# Patient Record
(demographics unavailable — no encounter records)

---

## 2025-03-15 NOTE — ED.PDOC
Altered Mental Status


HPI Comments


HPI:


Poor Historian.


21-year-old male brought in by ambulance for evaluation of suspected overdose.  

Per EMS, they picked him up from in front of [address] with the family was.  

They do not know exactly what he overdosed on.  They gave the patient Narcan in 

the field 2.5 intranasally which helped him regained consciousness.  Patient was

initially unresponsive with stable vital signs.  No history of fall or trauma.


Per mom:  No reported fall or trauma.  Patient appeared to be altered and 

confused and almost unresponsive.  Mom administered Narcan and with the ambulanc

e arrived they administered another dose of Narcan intranasally.  Patient was 

smoking a Bong with his cousin that had wax














Past Medical History:  Drug induced psychosis


Past Surgical History:  Denies any


Patient vapes nicotine and smokes marijuana











REVIEW OF SYSTEMS:





CONSTITUTIONAL:





Denies acute: fever, diaphoresis, chills,   





HEAD:


Denies acute:  headache, photophobia





Eyes:


Denies acute: Double vision, vision loss, eye pain, eye discharge.





EARS: 


Denies acute: tinnitus, hearing loss, ear discharge, ear pain,





THROAT: 


Denies acute: sore throat, swelling, difficulty swallowing , pain with 

swallowing, change in  voice.





NECK:


Denies acute: neck pain, neck swelling, stiff neck.





HEART:


Denies acute : chest pain, palpitations,


 


LUNGS:


Denies acute: SOB, wheezing, cough, hemoptysis





ABDOMEN:


Denies acute: abdominal pain, Nausea, Vomiting, diarrhea, melena , hematemesis, 

hematochezia





SKIN:


Denies acute: rash, redness, lesions, itchiness. 





EXTREMITIES:


Denies acute: calf pain, numbness, tingling, weakness, denies pain in extremity.


Denies acute: Low back pain. 





Neuro:


Denies acute: focal neurological deficit, motor or sensory focal neurological 

deficit, tremors, seizure like activity,  loss of bowel or bladder function, 

cauda equina like symptoms.  





: 


Denies acute: dysuria, hematuria, flank pain, increase in urinary frequency.











PSYCH: 


Denies acute: hallucination, suicidal ideation, homicidal ideation.


 








PHYSICAL EXAM:


General: no acute distress, awake and alert.





Head: normocephalic, atraumatic.





Neck: 


supple, trachea is midline, no swelling. 








Throat:  Normal phonation.








Eyes:, no erythema, no purulent discharge, no proptosis, no icterus.


   


 


Heart:  regular rate, regular rhythm, no significant murmur appreciated.





Lungs: no apparent respiratory distress, 


    


No wheezing, no rhonchi, no crackles.  No stridors


   Clear to auscultation bilaterally.





Abdomen: non tender to palpation, non distended, soft, no guarding, no rebound, 

+ bowel sounds.











Neuro: Awake, Alert, oriented to name, self, situation, follows commands


 GCS=15.  


Speech is normal.





   


Skin: no petechia, no purpura, no cyanosis, non-pale, not jaundice. 





Lower extremities:  --no - Pitting edema


no deformity, no focal swelling, no calf TTP. 





Makes eye contact.





moves all four extremities.  


   





Face: no apparent facial droop.





 





Stroke: finger to nose cerebellar testing is intact.


   No pronator drift.


   Symmetrical  muscle strength b/l





PERRLA, EOM-I


CN 2-12 are grossly intact,  


Pedal pulses are palpable.


No nystagmus.


No nuchal rigidity, Kernig's sign, Brudzinski's sign, no meningeal signs.














_________________________________________


ED COURSE:


Chief Complaint:  Overdose


Time Seen by MD:  15:15


Primary Care Provider:  NONE


Reviewed Notes:  Nurses Notes, Medications, Allergies


Allergies:  


Coded Allergies:  


     NO KNOWN ALLERGIES (Unverified , 1/11/22)


Home Meds


Active Scripts


Ibuprofen Micronized (Ibuprofen) 800 Mg Tab, 800 MG PO Q8HPRN PRN, #20 TAB


   Prov:ALEXIA TENA PAC         9/3/24


Sulfamethoxazole W/Trimethopri (Bactrim Ds Tablet) 1 Tab Tb, 1 TAB PO BID for 7 

Days, #14 TAB


   Prov:ALEXIA TENA [facility]         9/3/24


Naproxen (NAPROSYN TABLET) 500 Mg Tb, 1 TAB PO BID for 5 Days, #10 TAB 1 Refill


   Prov:DEONNA QUAN MD         7/30/22


Cefdinir (Cefdinir) 300 Mg Cap, 1 CAP PO BID for 5 Days, #10 CAP


   Prov:DEONNA QUAN MD         7/30/22


Acetaminophen (Tylenol Extra Strength) 500 Mg Tab, 500 MG PO Q4HP PRN for 10 

Days, #50 TAB


   Prov:LUIS POPE NP         1/11/22


Fdlgfcvnguw-Dvnhxhcu-Yx (Bromphen/Pseudoephedrine 30-2-10 mg/5Ml) 1 Syp Syp, 5 

ML PO TID PRN for 10 Days, #240 ML


   Prov:LUIS POPE NP         1/11/22


Prednisone (Prednisone) 20 Mg Tab, 40 MG PO DAILY for 5 Days, #10 TAB


   Prov:LUIS POPE NP         1/11/22


Azithromycin (Zithromax Z-Jamil) 250 Mg Tab, 250 MG PO take as directed for 5 D

ays, #6 TAB


   Prov:LUIS POPE NP         1/11/22


Information Source:  Patient, Emergency Med Personnel


Mode of Arrival:  Ambulatory





Past Medical History


PAST MEDICAL HISTORY:  Denies


Surgical History:  Denies all surgeries





Family History


Family History:  Reviewed,noncontributory to illness, No family hx of Cancer, No

family hx of DM, No family hx of Heart souleymane, No family hx of HTN, No family hx 

ofKidney souleymane, No family hx of Liver souleymane, No family hx of Lung souleymane, No family hx 

of Stroke





Social History


Smoker:  Non-Smoker


Alcohol:  Denies ETOH Use


Drugs:  Denies Drug Use


Lives In:  Home





Was a procedure done?


Was a procedure done?:  No





Differential Diagnosis (ALOC)


Differential Diagnosis:  Dehydration, Hypoglycemia, DKA, Encephalopathy, 

Meningitis, Sepsis, Hypoxemia, Seizure, Closed Head Injury, CVA, Mass Lesion, 

SAH, Drug Overdose, ETOH Intoxication, Heart Failure, Renal Failure





X-Ray, Labs, Meds, VS





                                   Vital Signs








  Date Time  Temp Pulse Resp B/P (MAP) Pulse Ox O2 Delivery O2 Flow Rate FiO2


 


3/15/25 15:26 98.1 105 18 131/86 (101) 100   





 98.1       


 


3/15/25 15:19  105      








                                       Lab








Test


 3/15/25


17:11 3/15/25


15:45 3/15/25


15:40 Range/Units


 


 


Troponin I High Sensitivity 29  9   </=54  ng/L


 


White Blood Count


 


 9.0 


 


 4.4-10.8


10^3/uL


 


Red Blood Count


 


 4.98 


 


 4.5-5.90


10^6/uL


 


Hemoglobin  14.9   13.5-17.5  g/dL


 


Hematocrit  42.8   41.0-53.0  %


 


Mean Corpuscular Volume  85.9   80.0-100.0  fL


 


Mean Corpuscular Hemoglobin  29.9   28.0-32.0  pg


 


Mean Corpuscular Hemoglobin


Concent 


 34.8 


 


 32.0-36.0  g/dL





 


Red Cell Distribution Width  13.2   11.8-14.3  %


 


Platelet Count


 


 236 


 


 140-450


10^3/uL


 


Mean Platelet Volume  7.2   6.9-10.8  fL


 


Neutrophils (%) (Auto)  69.0   37.0-80.0  %


 


Lymphocytes (%) (Auto)  19.8   10.0-50.0  %


 


Monocytes (%) (Auto)  5.8   0.0-12.0  %


 


Eosinophils (%) (Auto)  4.9   0.0-7.0  %


 


Basophils (%) (Auto)  0.5   0.0-2.0  %


 


Neutrophils # (Auto)


 


 6.2 


 


 1.6-8.6  10


^3/uL


 


Lymphocytes # (Auto)


 


 1.8 


 


 0.4-5.4  10


^3/uL


 


Monocytes # (Auto)  0.5   0-1.3  10 ^3/uL


 


Eosinophils # (Auto)  0.4   0-0.8  10 ^3/uL


 


Basophils # (Auto)  0   0-0.2  10 ^3/uL


 


Nucleated Red Blood Cells  0.1    %


 


Sodium Level  139   136-145  mmol/L


 


Potassium Level  4.3   3.5-5.1  mmol/L


 


Chloride Level  105     mmol/L


 


Carbon Dioxide Level  29   20-31  mmol/L


 


Anion Gap  5   5-15  


 


Blood Urea Nitrogen  11   9-23  mg/dL


 


Creatinine


 


 1.12 


 


 0.700-1.30


mg/dL


 


Glomerular Filtration Rate


Calc 


 96 


 


 >90  mL/min





 


BUN/Creatinine Ratio  9.8 L  10.0-20.0  


 


Serum Glucose  138 H    mg/dL


 


Lactic Acid Level  1.9   0.4-2.0  mmol/L


 


Calcium Level  9.7   8.7-10.4  mg/dL


 


Magnesium Level  1.9   1.6-2.6  mg/dL


 


Total Bilirubin  0.7   0.2-1.0  mg/dL


 


Aspartate Amino Transferase


(AST) 


 30 


 


 13-40  U/L





 


Alanine Aminotransferase (ALT)  55 H  7-40  U/L


 


Alkaline Phosphatase  68     U/L


 


Total Protein  7.1   5.7-8.2  g/dL


 


Albumin  4.4   3.2-4.8  g/dL


 


Urine Color   Light-yellow  Yellow  


 


Urine Clarity   Clear  Clear  


 


Urine pH   6.0  5.0-9.0  


 


Urine Specific Gravity   1.021  1.001-1.035  


 


Urine Protein   1+ H Negative  


 


Urine Ketones   Negative  Negative  


 


Urine Blood   Negative  Negative  /uL


 


Urine Nitrite   Negative  Negative  


 


Urine Bilirubin   Negative  Negative  


 


Urine Urobilinogen   Normal  Negative  mg/dL


 


Urine Leukocyte Esterase   Negative  Negative  /uL


 


Urine RBC   <1  0 - 3  /hpf


 


Urine Microscopic WBC   1  0-3  /HPF


 


Urine Squamous Epithelial


Cells 


 


 None seen 


 <5  /hpf





 


Urine Bacteria   None seen  None Seen  /hpf


 


Urine Hyaline Casts   Few  0 - 2  /lpf


 


Urine Mucus   Few  None Seen  


 


Urine Glucose   Normal  Normal  mg/dL


 


Urine Opiates Screen   Neg  NEGATIVE  


 


Urine Fentanyl Screen   Pos  NEGATIVE  


 


Urine Barbiturates Screen   Neg  NEGATIVE  


 


Urine Phencyclidine Screen   Neg  NEGATIVE  


 


Urine Amphetamines Screen   Neg  NEGATIVE  


 


Urine Benzodiazepines Screen   Neg  NEGATIVE  


 


Urine Cocaine Screen   Neg  NEGATIVE  


 


Urine Cannabinoids Screen   Pos  NEGATIVE  








                               Current Medications








 Medications


  (Trade)  Dose


 Ordered  Sig/Manuel


 Route  Start Time


 Stop Time Status Last Admin


 


 Naloxone HCl


  (Narcan)  2 mg  ONCE  ONCE


 IV  3/15/25 15:30


 3/15/25 15:31 DC 3/15/25 16:15





 


 Sodium Chloride  1,000 ml @ 


 1,000 mls/hr  Q1H ONCE


 IV  3/15/25 15:30


 3/15/25 16:29 DC 3/15/25 16:01











Time of 1ST Reevaluation:  15:14


Reevaluation 1ST:  Improved


Patient Education/Counseling:  Diagnosis, Treatment


Family Education/Counseling:  Other


Comments


Patient presented with the above HPI.---overdose---workup was initiated. patient

was found with the above mentioned diagnosis. 


No reported head injury or trauma.


the following medications were ordered:  


 please refer to order lists of meds and tests obtained by myself Dr. Park. 





Patient ED course and VS have been stabilized. Patient has been reassessed in 

the ED and remained in a stable condition.  





Pertinent incidental findings were discussed with the patient and/or family. 





Patient/family voices understanding and is agreeable with plan. 





Patient has been observed in the ED adequate length of time to insure 

improvement/stability. 





Escalation of care considered:


Consideration of escalation to observation or admission


 


Patient left against medical advice








All the reports of any imaging studies that were ordered by myself were reviewed

by myself.





Departure 1


Departure


Time of Disposition:  18:25


Impression:  


   Primary Impression:  


   Drug overdose


   Additional Impressions:  


   Overdose of fentanyl


   Altered level of consciousness


   Left against medical advice


Disposition:  07 LEFT AGAINST MEDICAL ADVICE


Condition:  Guarded





Additional Instructions:  


Left against medical advice


Discharged With:  Self





Critical Care Note


Critical Care Time?:  Yes (35 min-critical care time only)











JAMES PARK DO                Mar 15, 2025 15:37

## 2025-03-16 NOTE — ECG
[facility]

                                       

Test Date:    2025-03-15               Test Time:    15:19:52

Pat Name:     MAREN CORTES           Department:   ER

Patient ID:   [id number]           Room:          

Gender:       M                        Technician:   LA

:          2003               Requested By: JAMES PARK

Order Number: [id number]        Reading MD:   Alex Jacobson

                                 Measurements

Intervals                              Axis          

Rate:         105                      P:            65

MO:           154                      QRS:          97

QRSD:         89                       T:            -14

QT:           313                                    

QTc:          414                                    

                           Interpretive Statements

Sinus tachycardia

Lateral infarct, acute

Electronically Signed On 3- 20:42:36 PDT by Alex Jacobson



Please click the below link to view image of tracing.

## 2025-03-24 NOTE — ECG
[facility]

                                       

Test Date:    2025               Test Time:    17:18:56

Pat Name:     MAREN CORTES           Department:   ED

Patient ID:   [id number]           Room:          

Gender:       M                        Technician:   CRISS

:          2003               Requested By: AMALIA AVLERIO

Order Number: [id number]        Reading MD:   Alex Jacobson

                                 Measurements

Intervals                              Axis          

Rate:         108                      P:            54

DE:           133                      QRS:          84

QRSD:         91                       T:            -6

QT:           324                                    

QTc:          435                                    

                           Interpretive Statements

Sinus tachycardia

Borderline T abnormalities, inferior leads

Baseline wander in lead(s) II,V3

Electronically Signed On 3- 13:25:43 PDT by Alex Jacobson



Please click the below link to view image of tracing.

## 2025-03-24 NOTE — [facility]
CHEST RADIOGRAPH



Indication: aloc



Technique: Single frontal view of the chest was obtained



COMPARISON: None



FINDINGS: 



Lines and Tubes: None



Lungs: Clear



Pleura: No effusion.



No pneumothorax. 



Cardiomediastinal contours: Unremarkable



Bones: Unremarkable



IMPRESSION: 



1. No acute disease. 



 



Electronically Signed by: Fady Ibarra at 03/24/2025 17:08:25 PM

## 2025-03-24 NOTE — ED.PDOC
SOB-HPI


HPI Comments


21 y.o male presents to the ED for a chief complaint of generalized weakness 

associated with palpitations x 2 days. Patient presents to triage with SPO2 in 

the low 80's on room air, shallowing breathing and cyanotic lips. Patient was 

placed on 10 liters of oxygen via mask with SPO2 of 97%. Patient reports daily 

frequent use of Fentanyl and last used 2 days ago. Patient reports he was 

playing basketball for 2 hours today, friends noticed patient pale and malaise 

looking. Mom brought patient in. 


Patient was recently seen at this ED around 3/15/25 for possible OD but AMA once

regaining full consciousness s/p multiple Narcan administrated by EMS, family 

and nursing staff at the ED. Patient at this time denies any chest pain, fever, 

chills, nausea, vomiting.


Chief Complaint:  Palpitations


Time Seen by MD:  16:26


Primary Care Provider:  NONE


Reviewed notes:  Nurses Notes, Medications, Allergies


Information Source:  Patient


Mode of Arrival:  Ambulatory


Severity:  Moderate


Timing:  Hours


Duration:  Since onset


Context:  At Rest


PE Risk Factors:  None


History of:  None


Modifying Factors:  Nothing


Associated Signs and Symptoms:  Other





Past Medical History


PAST MEDICAL HISTORY:  Denies


Surgical History:  Denies all surgeries





Family History


Family History:  Reviewed,noncontributory to illness, No family hx of Cancer, No

family hx of DM, No family hx of Heart souleymane, No family hx of HTN, No family hx 

ofKidney souleymane, No family hx of Liver souleymane, No family hx of Lung souleymane, No family hx 

of Stroke





Social History


Smoker:  Cigarettes, Other (vape)


Alcohol:  Occasionally


Drugs:  Marijuana, Methamphetamine


Lives In:  Home





Constitutional:  reports: malaise; denies: chills, diaphoresis, fatigue, fever, 

sweats, weakness, others


EENTM:  denies: blurred vision, double vision, ear bleeding, ear discharge, ear 

drainage, ear pain, ear ringing, eye pain, eye redness, hearing loss, mouth 

pain, mouth swelling, nasal discharge, nose bleeding, nose congestion, nose 

pain, photophobia, tearing, throat pain, throat swelling, voice changes, others


Respiratory:  denies: cough, hemoptysis, orthopnea, SOB at rest, shortness of 

breath, SOB with excertion, stridor, wheezing, others


Cardiovascular:  reports: palpitations; denies: chest pain, dizzy spells, 

diaphoresis, Dyspnea on exertion, edema, irregular heart beat, left arm pain, 

lightheadedness, PND, syncope, others


Gastrointestinal:  denies: abdomen distended, abdominal pain, blood streaked 

bowels, constipated, diarrhea, dysphagia, difficulty swallowing, hematemesis, 

melena, nausea, poor appetite, poor fluid intake, rectal bleeding, rectal pain, 

vomiting, others


Genitourinary:  denies: burning, dysuria, flank pain, frequency, hematuria, 

incontinence, penile discharge, penile sore, pain, testicle pain, testicle 

swelling, urgency, others


Neurological:  denies: dizziness, fainting, headache, left sided numbness, left 

sided weakness, numbness, paresthesia, pre-existing deficit, right sided 

numbness, right sided weakness, seizure, speech problems, tingling, tremors, 

weakness, others


Musculoskeletal:  denies: back pain, gout, joint pain, joint swelling, muscle 

pain, muscle stiffness, neck pain, others


Integumetry:  denies: bruises, change in color, change in hair/nails, dryness, 

laceration, lesions, lumps, rash, wounds, others


Allergic/Immunocompromised:  denies: Difficulty Healing, Frequent Infections, 

Hives, Itching, others


Hematologic/Lymphatic:  denies: anemia, blood clots, easy bleeding, easy 

bruising, swollen glands, others


Endocrine:  denies: excessive hunger, excessive sweating, excessive thirst, 

excessive urination, flushing, intolerance to cold, intolerance to heat, 

unexplained weight gain, unexplained weight loss, others


Psychiatric:  denies: anxiety, bipolar disorder, depression, hopeless, panic 

disorder, schizophrenia, sleepless, suicidal, others


All Other Systems:  Reviewed and Negative





Physical Exam


General Appearance:  Other (The patient was somewhat sleepy upon arrival)


HEENT:  Normal ENT Inspection, Pharynx Normal, TMs Normal


Neck:  Full Range of Motion, Non-Tender, Normal, Normal Inspection


Respiratory:  Chest Non-Tender, Lungs Clear, No Accessory Muscle Use, No 

Respiratory Distress, Normal Breath Sounds


Cardiovascular:  No Edema, No JVD, No Murmur, No Gallop, Normal Peripheral 

Pulses, Regular Rate/Rhythm


Breast Exam:  Deferred


Gastrointestinal:  No Organomegaly, Non Tender, No Pulsatile Mass, Normal Bowel 

Sounds, Soft


Genitalia:  Deferred


Pelvic:  Deferred


Rectal:  Deferred


Extremities:  No calf tenderness, Normal capillary refill, Normal inspection, 

Normal range of motion, Non-tender, No pedal edema


Musculoskeletal :  


   Apperance:  Normal


Neurologic:  Alert, CNs II-XII nml as Tested, No Motor Deficits, Normal Affect, 

Normal Mood, No Sensory Deficits


Cerebellar Function:  Normal


Reflexes:  Normal


Skin:  Dry, Normal Color, Warm


Lymphatic:  No Adenopathy





EKG


EKG :  


   Pulse Rate (adult):  104


   Cardiac Rhythm:  ST





Was a procedure done?


Was a procedure done?:  No





Differential Dx


Differential Diagnosis:  Hyperventilation, Respiratory Distress, URI


Comments


Drug toxicity, Drug overdose, tachycardia, hypoxemia





X-Ray, Labs, Meds, VS





                                   Vital Signs








  Date Time  Temp Pulse Resp B/P (MAP) Pulse Ox O2 Delivery O2 Flow Rate FiO2


 


3/24/25 18:38  102      


 


3/24/25 18:00  94 20 118/63 (81) 97   


 


3/24/25 17:18  108      


 


3/24/25 16:46 97.6 126 12 106/54 (71) 97   





 97.6       


 


3/24/25 16:46  127 12  97 Simple Mask* 8 60


 


3/24/25 16:39  104      


 


3/24/25 16:31 97.9 115 12 110/65 (80) 83   





 97.9       


 


3/24/25 16:25  104      








                                       Lab








Test


 3/24/25


16:32 Range/Units


 


 


White Blood Count


 8.7 


 4.4-10.8


10^3/uL


 


Red Blood Count


 5.07 


 4.5-5.90


10^6/uL


 


Hemoglobin 15.0  13.5-17.5  g/dL


 


Hematocrit 43.4  41.0-53.0  %


 


Mean Corpuscular Volume 85.6  80.0-100.0  fL


 


Mean Corpuscular Hemoglobin 29.6  28.0-32.0  pg


 


Mean Corpuscular Hemoglobin


Concent 34.6 


 32.0-36.0  g/dL





 


Red Cell Distribution Width 12.9  11.8-14.3  %


 


Platelet Count


 288 


 140-450


10^3/uL


 


Mean Platelet Volume 7.4  6.9-10.8  fL


 


Neutrophils (%) (Auto) 58.5  37.0-80.0  %


 


Lymphocytes (%) (Auto) 28.8  10.0-50.0  %


 


Monocytes (%) (Auto) 4.7  0.0-12.0  %


 


Eosinophils (%) (Auto) 7.1 H 0.0-7.0  %


 


Basophils (%) (Auto) 0.9  0.0-2.0  %


 


Neutrophils # (Auto)


 5.1 


 1.6-8.6  10


^3/uL


 


Lymphocytes # (Auto)


 2.5 


 0.4-5.4  10


^3/uL


 


Monocytes # (Auto) 0.4  0-1.3  10 ^3/uL


 


Eosinophils # (Auto) 0.6  0-0.8  10 ^3/uL


 


Basophils # (Auto) 0.1  0-0.2  10 ^3/uL


 


Nucleated Red Blood Cells 0.1   %


 


Urine Color Yellow  Yellow  


 


Urine Clarity Turbid H Clear  


 


Urine pH 6.0  5.0-9.0  


 


Urine Specific Gravity 1.025  1.001-1.035  


 


Urine Protein 1+ H Negative  


 


Urine Ketones Trace  Negative  


 


Urine Blood Negative  Negative  /uL


 


Urine Nitrite Negative  Negative  


 


Urine Bilirubin Negative  Negative  


 


Urine Urobilinogen Normal  Negative  mg/dL


 


Urine Leukocyte Esterase Negative  Negative  /uL


 


Urine RBC 1  0 - 3  /hpf


 


Urine Microscopic WBC 3  0-3  /HPF


 


Urine Squamous Epithelial


Cells Few 


 <5  /hpf





 


Urine Bacteria None seen  None Seen  /hpf


 


Urine Hyaline Casts Few  0 - 2  /lpf


 


Urine Granular Casts Few  0  /lpf


 


Urine Mucus Few  None Seen  


 


Urine Glucose Trace  Normal  mg/dL


 


Sodium Level 138  136-145  mmol/L


 


Potassium Level 4.4  3.5-5.1  mmol/L


 


Chloride Level 99    mmol/L


 


Carbon Dioxide Level 30  20-31  mmol/L


 


Anion Gap 9  5-15  


 


Blood Urea Nitrogen 12  9-23  mg/dL


 


Creatinine


 1.71 H


 0.700-1.30


mg/dL


 


Glomerular Filtration Rate


Calc 58 


 >90  mL/min





 


BUN/Creatinine Ratio 7.0 L 10.0-20.0  


 


Serum Glucose 182 H   mg/dL


 


Calcium Level 10.0  8.7-10.4  mg/dL


 


Total Bilirubin 1.0  0.2-1.0  mg/dL


 


Aspartate Amino Transferase


(AST) 49 H


 13-40  U/L





 


Alanine Aminotransferase (ALT) 78 H 7-40  U/L


 


Alkaline Phosphatase 72    U/L


 


Troponin I High Sensitivity 14  </=54  ng/L


 


Total Protein 7.8  5.7-8.2  g/dL


 


Albumin 4.8  3.2-4.8  g/dL


 


Urine Opiates Screen Neg  NEGATIVE  


 


Urine Fentanyl Screen Pos  NEGATIVE  


 


Urine Barbiturates Screen Neg  NEGATIVE  


 


Urine Phencyclidine Screen Neg  NEGATIVE  


 


Urine Amphetamines Screen Neg  NEGATIVE  


 


Urine Benzodiazepines Screen Neg  NEGATIVE  


 


Urine Cocaine Screen Neg  NEGATIVE  


 


Urine Cannabinoids Screen Pos  NEGATIVE  


 


Plasma/Serum Blood Alcohol < 3.0  <10  mg/dL





CHEST RADIOGRAPH


IMPRESSION: 





1. No acute disease. 





 The patient's urine tox is positive for fentanyl and marijuana 


The patient was urine test is negative for infection 


The chemistry panel is within normal limits except for mild hyperglycemia 


The patient CBC is within normal limits 


At this time, the patient was being discharged and will follow up with the 

primary care doctor


The patient was given substance abuse counseling 


The patient was now awake and alert and able to answer questions.


Images Reviewed?:  Images reviewed and evaluated by me


Time of 1ST Reevaluation:  16:33


Reevaluation 1ST:  Unchanged


Patient Education/Counseling:  Diagnosis, Treatment, Prognosis, Need For Follow 

Up


Family Education/Counseling:  No Family Present





Departure 1


Departure


Time of Disposition:  19:59


Impression:  


   Primary Impression:  


   Polysubstance abuse


Disposition:  01 HOME / SELF CARE / HOMELESS


Condition:  Fair


Discharged With:  Self





Critical Care Note


Critical Care Time?:  No





Stability


Stability form required:  No








I personally scribed for AMALIA VALERIO MD ([email or web address]) on 3/24/25 at 16:39.  

Electronically submitted by [facility] Barksdale [facility]


I personally scribed for AMALIA VALERIO MD ([email or web address]) on 3/24/25 at 19:31.  

Electronically submitted by Nathaly Barksdale ([facility]





AMALIA VALERIO MD              Mar 24, 2025 16:39

## 2025-03-26 NOTE — ED.PDOC
History of Present Illness


HPI Comments


22 y/o obese M, with a history of polysubstance abuse, is BIBA for c/o ALOC 

w/decrease responsiveness s/p substance overdose, today. Per EMS report, 

patient's family called after finding patient in altered state, earlier, this 

evening. Patient was noted to have been found on scene with family performing 

CPR amidst pulse present and patient  haivng agonal breathing and a SpO2 of 

75%RA. EMS then endorses on giving the patient 2mg Narcan IV, with positive 

response, and placing him on 15LPM NRB en route. Upon arrival to ED, osvaldo 

admitst to Fentanyl use, earlier, with no other reported associated symptoms at 

this time.


Chief Complaint:  Overdose


Time Seen by MD:  23:10


Primary Care Provider:  unknown


Reviewed Notes:  Nurses Notes, Paramedic Notes, Medications, Allergies


Allergies:  


Coded Allergies:  


     NO KNOWN ALLERGIES (Unverified , 22)


Home Meds


Active Scripts


Ibuprofen Micronized (Ibuprofen) 800 Mg Tab, 800 MG PO Q8HPRN PRN, #20 TAB


   Prov:ALEXIA TENA PAC         9/3/24


Sulfamethoxazole W/Trimethopri (Bactrim Ds Tablet) 1 Tab Tb, 1 TAB PO BID for 7 

Days, #14 TAB


   Prov:ALEXIA TENA PAC         9/3/24


Naproxen (NAPROSYN TABLET) 500 Mg Tb, 1 TAB PO BID for 5 Days, #10 TAB 1 Refill


   Prov:DEONNA QUAN MD         22


Cefdinir (Cefdinir) 300 Mg Cap, 1 CAP PO BID for 5 Days, #10 CAP


   Prov:DEONNA QUAN MD         22


Acetaminophen (Tylenol Extra Strength) 500 Mg Tab, 500 MG PO Q4HP PRN for 10 

Days, #50 TAB


   Prov:LUIS POPE NP         22


Dtysicvdugj-Filljtxp-Cv (Bromphen/Pseudoephedrine 30-2-10 mg/5Ml) 1 Syp Syp, 5 

ML PO TID PRN for 10 Days, #240 ML


   Prov:LUIS POPE NP         22


Prednisone (Prednisone) 20 Mg Tab, 40 MG PO DAILY for 5 Days, #10 TAB


   Prov:LUIS POPE NP         22


Azithromycin (Zithromax Z-Jamil) 250 Mg Tab, 250 MG PO take as directed for 5 

Days, #6 TAB


   Prov:LUIS POPE NP         22


Information Source:  Patient, Emergency Med Personnel


Mode of Arrival:  EMS


Severity:  Moderate


Timing:  Hours


Duration:  Since onset


Prehospital treatment:  12 Lead EKG, Accucheck, Cardiac Monitor, Other (Narcan )





Past Medical History


Past Medical History (Other):  


 obesity


Surgical History:  Denies all surgeries





Family History


Family History:  Reviewed,noncontributory to illness, No family hx of Cancer, No

family hx of DM, No family hx of Heart souleymane, No family hx of HTN, No family hx 

ofKidney souleymane, No family hx of Liver souleymane, No family hx of Lung souleymane, No family hx 

of Stroke





Social History


Smoker:  Cigarettes, Other (vape )


Alcohol:  Occasionally


Drugs:  Marijuana, Methamphetamine, Other (Fentanly)


Lives In:  Home





All Other Systems:  Reviewed and Negative (Comprehensive systems review obtained

and negative except for what is stated in the HPI.)





Physical Exam


General Appearance:  No Apparent Distress, Obese


HEENT:  Normal ENT Inspection, Pharynx Normal, TMs Normal


Neck:  Full Range of Motion, Non-Tender, Normal, Normal Inspection


Respiratory:  Other (hypoxic )


Cardiovascular:  No Edema, No JVD, No Murmur, No Gallop, Normal Peripheral 

Pulses, Regular Rate/Rhythm


Breast Exam:  Deferred


Gastrointestinal:  No Organomegaly, Non Tender, No Pulsatile Mass, Normal Bowel 

Sounds, Soft


Genitalia:  Deferred


Pelvic:  Deferred


Rectal:  Deferred


Extremities:  No calf tenderness, Normal capillary refill, Normal inspection, 

Normal range of motion, Non-tender, No pedal edema


Musculoskeletal :  


   Extremity Location:  Chest (chest wall )


   Apperance:  Normal, Tenderness


Neurologic:  Alert, CNs II-XII nml as Tested, No Motor Deficits, Normal Affect, 

Normal Mood, No Sensory Deficits


Cerebellar Function:  Normal


Reflexes:  Normal


Skin:  Dry, Pallor, Warm, Other (erythema to chest wall )


Lymphatic:  No Adenopathy





Was a procedure done?


Was a procedure done?:  No





Differential Dx


Considerations may include:


opioid overdose, substance abuse, toxic metabolic encephalopathy





X-Ray, Labs, Meds, VS





                                   Vital Signs








  Date Time  Temp Pulse Resp B/P (MAP) Pulse Ox O2 Delivery O2 Flow Rate FiO2


 


3/26/25 02:00  73 16 106/51 (69) 94   


 


3/25/25 23:32 97.9 108 12 137/82 (100) 97   





 97.9       


 


3/25/25 23:07 97.7 112 17 120/73 (89) 88   





 97.7       


 


3/25/25 23:07  112 17  88 Non-Rebreather 15 N/A


 


3/25/25 22:50 99.1 119 32 113/76 (88) 90   





 99.1       








                                       Lab








Test


 3/26/25


05:11 Range/Units


 


 


White Blood Count


 15.0 #H


 4.4-10.8


10^3/uL


 


Red Blood Count


 5.48 


 4.5-5.90


10^6/uL


 


Hemoglobin 16.2  13.5-17.5  g/dL


 


Hematocrit 46.5  41.0-53.0  %


 


Mean Corpuscular Volume 84.9  80.0-100.0  fL


 


Mean Corpuscular Hemoglobin 29.5  28.0-32.0  pg


 


Mean Corpuscular Hemoglobin


Concent 34.8 


 32.0-36.0  g/dL





 


Red Cell Distribution Width 12.6  11.8-14.3  %


 


Platelet Count


 299 


 140-450


10^3/uL


 


Mean Platelet Volume 7.4  6.9-10.8  fL


 


Neutrophils (%) (Auto) 94.3 H 37.0-80.0  %


 


Lymphocytes (%) (Auto) 3.0 L 10.0-50.0  %


 


Monocytes (%) (Auto) 2.3  0.0-12.0  %


 


Eosinophils (%) (Auto) 0.2  0.0-7.0  %


 


Basophils (%) (Auto) 0.2  0.0-2.0  %


 


Neutrophils # (Auto)


 14.2 H


 1.6-8.6  10


^3/uL


 


Lymphocytes # (Auto)


 0.4 


 0.4-5.4  10


^3/uL


 


Monocytes # (Auto) 0.3  0-1.3  10 ^3/uL


 


Eosinophils # (Auto) 0  0-0.8  10 ^3/uL


 


Basophils # (Auto) 0  0-0.2  10 ^3/uL


 


Nucleated Red Blood Cells 0.1   %


 


Sodium Level Pending   


 


Potassium Level Pending   


 


Chloride Level Pending   


 


Carbon Dioxide Level Pending   


 


Anion Gap Pending   


 


Blood Urea Nitrogen Pending   


 


Creatinine Pending   


 


Glomerular Filtration Rate


Calc Pending 


  





 


BUN/Creatinine Ratio Pending   


 


Serum Glucose Pending   


 


Lactic Acid Level Pending   


 


Calcium Level Pending   








                               Current Medications








 Medications


  (Trade)  Dose


 Ordered  Sig/Manuel


 Route  Start Time


 Stop Time Status Last Admin


 


 Sodium Chloride  1,000 ml @ 


 1,000 mls/hr  Q1H ONCE


 IV  3/26/25 00:00


 3/26/25 03:41 DC 3/25/25 23:16





 


 Naloxone HCl


  (Narcan)  2 mg  ONCE  ONCE


 IV  3/26/25 03:45


 3/26/25 03:46 DC 3/25/25 23:10





 


 Ondansetron HCl


  (Zofran)  4 mg  ONCE  ONCE


 IV  3/26/25 03:45


 3/26/25 03:46 DC 3/25/25 23:14





 


 Methylprednisolone


 Sodium Succinate


  (Solu Medrol)  125 mg  ONCE  ONCE


 IV  3/26/25 03:45


 3/26/25 03:46 DC 3/25/25 23:16

















[place] [id number]


Ph: [phone]





DIAGNOSTIC IMAGING


Diagnostic Imaging Report : [id number]


Signed








PATIENT: MAREN CORTES   ACCT: [id number]   UNIT: [id number]


: 2003   LOC: ER   ROOM / BED:  / 


AGE / SEX: 21 / M   ADM STATUS: REG ER   SERVICE DT: 25





ORDERING PHYSICIAN: ALEXIA TENA PAC


PROCEDURE(s): CXRP - CHEST PORTABLE


REASON: SOB


ORDER NUMBER(s): [id number], ACCESSION NUMBER(s): [id number]








CHEST RADIOGRAPH





Indication: SOB





Technique: Single frontal view of the chest was obtained





COMPARISON: XY CHEST PORTABLE on DOS: 3/24/25





FINDINGS: 





Lines and Tubes: None





Lungs: Lung volumes are low with mild bibasilar subsegmental atelectasis.





Pleura: No effusion. No pneumothorax. 





Cardiomediastinal contours: Unremarkable





Bones: Unremarkable





IMPRESSION: 





Low lung volumes. Mild bibasilar subsegmental atelectasis. 





Electronically Signed by: Nathan Bowen at 2025 23:58:58 PM











DICTATED BY: NATHAN BOWEN MD


DICTATED DATE/TIME: 25 4755





SIGNED BY: NATHAN BOWEN MD


SIGNED DATE/TIME: 25 5009





CC:


Time of 1ST Reevaluation:  23:30


Reevaluation 1ST:  Unchanged


Patient Education/Counseling:  Diagnosis, Treatment


Family Education/Counseling:  Diagnosis, Treatment


Additional Information


Previous visit documents reviewed: 2025 encounter for polysubstance 

abuse 





The following tests were ordered, and results were reviewed by me: CXR





Additional Information was gathered from interviewing the following independent 

historians: EMS, family 





I reviewed and agreed with the following test results read by other providers: 

CXR





I discussed treatment and results with medical personnel and: Patient, family





Departure 1


Departure


Time of Disposition:  05:50 (Patient presented as a opiate overdose unresponsive

and hypoxic even on a regular.  Patient received multiple rounds of Narcan began

breathing in.  Patient had a lot of emesis.  Patient required a non-rebreather 

for several hours until was being able to transition to a nasal cannula.  

Patient is still hypoxic on room air.  I am concern for aspiration we will 

empirically cover patient with antibiotics and admit patient for further workup)


Impression:  


   Primary Impression:  


   Opiate overdose


   Qualified Codes:  T40.601A - Poisoning by unspecified narcotics, accidental 

   (unintentional), initial encounter


   Additional Impressions:  


   Aspiration into respiratory tract


   Qualified Codes:  T17.908A - Unspecified foreign body in respiratory tract, 

   part unspecified causing other injury, initial encounter


   Acute respiratory failure


   Qualified Codes:  J96.01 - Acute respiratory failure with hypoxia


Disposition:   ADMITTED AS INPATIENT


Admit to:  Med Surg


Condition:  Serious





Critical Care Note


Critical Care Time?:  Yes


Critical care comment:


Acute hypoxic respiratory failure secondary to overdose


Authorized and Performed by: Daphne Lizarraga MD


Total critical care time: Approximately 143 minutes


Due to a high probability of clinically significant, life threatening d

eterioration, the patient required my highest level of preparedness to intervene

emergently and I personally spent this critical care time directly and 

personally managing the patient. This critical care time included obtaining a 

history; examining the patient; pulse oximetry; ordering and review of studies; 

arranging urgent treatment with development of a management plan; evaluation of 

patient's response to treatment; frequent reassessment; and, discussions with 

other providers.


This critical care time was performed to assess and manage the high probability 

of imminent, life-threatening deterioration that could result in multi-organ 

failure. It was exclusive of separately billable procedures and treating other 

patients and teaching time.


Please see my other sections and the rest of the note for further information on

patient assessment and treatment.





Stability


Stability form required:  No





Heart Score


Heart Score:  








Heart Score Response (Comments) Value


 


History N/A 0


 


EKG N/A 0


 


Age N/A 0


 


Risk Factors N/A 0


 


Troponin N/A 0


 


Total  0














I personally scribed for DAPHNE LIZARRAGA MD ([email or web address]) on 3/26/25 at 03:53.  

Electronically submitted by Sammy Zarate ([email or web address]).


I personally scribed for DAPHNE LIZARRAGA MD ([email or web address]) on 3/26/25 at 03:54.  

Electronically submitted by Sammy Zarate ([email or web address]).





DAPHNE LIZARRAGA MD               Mar 26, 2025 03:53

## 2025-03-26 NOTE — DVH
CHEST RADIOGRAPH



Indication: SOB



Technique: Single frontal view of the chest was obtained



COMPARISON: XY CHEST PORTABLE on DOS: 3/24/25



FINDINGS: 



Lines and Tubes: None



Lungs: Lung volumes are low with mild bibasilar subsegmental atelectasis.



Pleura: No effusion. No pneumothorax. 



Cardiomediastinal contours: Unremarkable



Bones: Unremarkable



IMPRESSION: 



Low lung volumes. Mild bibasilar subsegmental atelectasis. 



Electronically Signed by: Nathan Naidu at 03/25/2025 23:58:58 PM

## 2025-03-26 NOTE — DVHHP2
History of Present Illness


Reason for Visit:  Opiate overdose


History of Present Illness


Jenna Brady is a 21-year-old male with past medical history of anxiety and 

drug induced psychosis, who came in for a fentanyl overdose. Family states they 

found him unconscious in his room with agonal respirations. They initiated CPR 

and called EMS. When EMS arrived his oxygenation was in the 70sm  Narcan was 

given, and patient began to respond. He was placed on 15L nonrebreather and 

brought to ER. In ER patient received another dose of Narcan. ER attempted 

multiple times to wean patient off supplemental oxygen without success. On 

assessment patient is now A&O x 4, able to tell me that he remembers snorting 

fentanyl, taking a shower, then blacking out in bed. He states he has just 

started trying fentanyl this week. He last used marijuana last week, and 

methamphetamines about 2 months ago. Also states he is a current cigarette 

smoker and uses nicotine vape daily. I spoke with the patient's Mother. She 

states that he use to methamphetamines, and was sober for about 1 year. He now 

lives with his Grandmother, so she is not around him all the time. Patient has 

been seen here twice in the last 2 weeks for fentanyl overdose.


Psych:  Anxiety, Other (drug induced psychosis)


Past Surgical History:  None


Family History:  None


Smoke:  <1 pack per day (and vape)


ALCOHOL:  none


Drugs:  Marijuana, Other (fentanyl, methamphetamines about 2 months ago)


Lives:  with Family





Review of Systems


Constitutional:  Yes: Other (Opiate overdose ); No: Fever, Chills, Sweats, 

Weakness, Malaise


Eyes:  No: Pain, Vision change, Conjunctivae inflammation, Eyelid inflammation, 

Other, Redness


ENT:  No: Ear pain, Ear discharge, Nose pain, Nose discharge, Nose congestion, 

Mouth pain, Mouth swelling, Throat pain, Throat swelling, Other


Respiratory:  No: Cough, Dry, Shortness of breath, SOB with excertion, Wheezing,

Hemoptysis, Pleuritic Pain, Sputum, Wheezing, Other


Cardiovascular:  No: Chest Pain, Palpitations, Orthopnea, Paroxysmal Noc. 

Dyspnea, Edema, Lt Headedness, Other


Gastrointestinal:  No: Nausea, Vomiting, Abdominal Pain, Diarrhea, Constipation,

Melena, Hematochezia, Other


Genitourinary:  No Dysuria, No Frequency, No Incontinence, No Hematuria, No 

Retention, No Other


Musculoskeletal:  No: other, neck pain, shoulder pain, arm pain, back pain, hand

pain, leg pain, foot pain


Skin:  No: Rash, Lesions, Jaundice, Bruising, Other


Neurological:  Weakness, Incoordination, Change in speech, Confusion, Other 

(Opiate overdose ); No: Numbness, Seizures


Allergies:  


Coded Allergies:  


     NO KNOWN ALLERGIES (Unverified , 1/11/22)





Exam


Vital Signs





Vital Signs








  Date Time  Temp Pulse Resp B/P (MAP) Pulse Ox O2 Delivery O2 Flow Rate FiO2


 


3/26/25 07:16  67 10 124/60 (81) 96   


 


3/26/25 07:16      Nasal Cannula* 4 36


 


3/25/25 23:32 97.9       





 97.9       








General Appearance:  Alert, Oriented X3, Cooperative, mild distress


HEENT:  Atraumatic, PERRLA


Respiratory:  Clear to auscultation, Normal air movement


Cardiovascular:  Regular rate, Normal S1, Normal S2


Abdominal:  Normal bowel sounds, Soft, No tenderness


Extremities:  No clubbing, No cyanosis, No edema, Normal pulses


Skin:  No rashes, No breakdown, No significant lesion


Neuro:  Normal gait, Normal speech, Strength at 5/5 X4 ext, Normal tone


Psych/Mental Status:  Mental status NL, Mood NL





Labs/Xrays





                                      Labs








Test


 3/26/25


06:07 3/26/25


05:11 Range/Units


 


 


POC Glucose 171 H    mg/dl


 


White Blood Count


 


 15.0 #H


 4.4-10.8


10^3/uL


 


Red Blood Count


 


 5.48 


 4.5-5.90


10^6/uL


 


Hemoglobin  16.2  13.5-17.5  g/dL


 


Hematocrit  46.5  41.0-53.0  %


 


Mean Corpuscular Volume  84.9  80.0-100.0  fL


 


Mean Corpuscular Hemoglobin  29.5  28.0-32.0  pg


 


Mean Corpuscular Hemoglobin


Concent 


 34.8 


 32.0-36.0  g/dL





 


Red Cell Distribution Width  12.6  11.8-14.3  %


 


Platelet Count


 


 299 


 140-450


10^3/uL


 


Mean Platelet Volume  7.4  6.9-10.8  fL


 


Neutrophils (%) (Auto)  94.3 H 37.0-80.0  %


 


Lymphocytes (%) (Auto)  3.0 L 10.0-50.0  %


 


Monocytes (%) (Auto)  2.3  0.0-12.0  %


 


Eosinophils (%) (Auto)  0.2  0.0-7.0  %


 


Basophils (%) (Auto)  0.2  0.0-2.0  %


 


Neutrophils # (Auto)


 


 14.2 H


 1.6-8.6  10


^3/uL


 


Lymphocytes # (Auto)


 


 0.4 


 0.4-5.4  10


^3/uL


 


Monocytes # (Auto)  0.3  0-1.3  10 ^3/uL


 


Eosinophils # (Auto)  0  0-0.8  10 ^3/uL


 


Basophils # (Auto)  0  0-0.2  10 ^3/uL


 


Nucleated Red Blood Cells  0.1   %


 


Sodium Level  135 L 136-145  mmol/L


 


Potassium Level  5.7 *H 3.5-5.1  mmol/L


 


Chloride Level  102    mmol/L


 


Carbon Dioxide Level  28  20-31  mmol/L


 


Anion Gap  5  5-15  


 


Blood Urea Nitrogen  11  9-23  mg/dL


 


Creatinine


 


 1.05 


 0.700-1.30


mg/dL


 


Glomerular Filtration Rate


Calc 


 104 


 >90  mL/min





 


BUN/Creatinine Ratio  10.5  10.0-20.0  


 


Serum Glucose  158 H   mg/dL


 


Lactic Acid Level  1.4  0.4-2.0  mmol/L


 


Calcium Level  10.3  8.7-10.4  mg/dL





CHEST RADIOGRAPH





FINDINGS: 





Lines and Tubes: None





Lungs: Lung volumes are low with mild bibasilar subsegmental atelectasis.





Pleura: No effusion. No pneumothorax. 





Cardiomediastinal contours: Unremarkable





Bones: Unremarkable





IMPRESSION: 





Low lung volumes. Mild bibasilar subsegmental atelectasis.





Assessment/Plan


Assessment/Plan


Assessment: 


Opiate overdose, 


Acute hypoxic respiratory failure, 


Possible aspiration pneumonia, 


Polysubstance abuse, 


Leucocytosis, 


Anxiety, 


Drug induced psychosis, 





Plan: 


Admit to Tele, 


IV antibiotics, 


IV hydration, 


Chest X-ray tomorrow morning, 


Social service consult, 


Supplemental oxygen as needed, 


Breathing treatments as needed, 


UA, 


UDS, 


Home medications reconciled,


Plan discussed with:  Patient


My Orders





                         Orders - ANDRZEJ PALMER








Procedure Category Date Status





  Time 


 


Admit ADMIT 3/26/25 Verified





  09:14 


 


Code Status CODE 3/26/25 Verified





  09:14 


 


Ondansetron Hcl PHA 3/26/25 Verified





 (Zofran)  09:15 


 


Docusate Sodium PHA 3/26/25 Verified





Capsule (Colace  09:15 


 


Complete Blood Count LAB 3/27/25 Verified





  04:00 


 


Comprehensive LAB 3/27/25 Verified





Metabolic Panel  04:00 


 


Condition: Critical [facility] 3/26/25 Verified





  09:14 


 


Acetaminophen Tablet PHA 3/26/25 Verified





 (Tylenol Tablet)  09:15 


 


Nitroglycerin PHA 3/26/25 Verified





Sublingual (Ntrostat  09:15 


 


Morphine Sulfate PHA 3/26/25 Verified





Injection  09:15 


 


Stat Ekg For Chest [facility] 3/26/25 Verified





Pain  09:14 


 


Notify Md Of Changes [facility] 3/26/25 Verified





From Base  09:14 


 


Telemetry Monitor For [facility] 3/26/25 Verified





24 Hours  09:14 


 


Emergency Dysrhythmia [facility] 3/26/25 Verified





Protocol  09:14 


 


Rhythm Strips Once [facility] 3/26/25 Verified





Every Shift  09:14 


 


Oxygen By Nasal RT 3/26/25 Verified





Cannula  09:14 


 


Regular Diet DIET 3/26/25 Verified





  Breakfast 


 


Azithromycin  500mg/ PHA 3/26/25 Verified





250ml (Zithromax 50  10:00 


 


Cefepime  1 Gm PHA 3/26/25 Verified





  10:00 











Date of Service:  Mar 26, 2025


Billing Provider:  ANDRZEJ PALMER


Common Visit Codes:  99223-INITIAL  INP/OBS CARE (HIGH)











ANDRZEJ PALMER          Mar 26, 2025 09:43

## 2025-03-27 NOTE — ECG
[facility]

                                       

Test Date:    2025               Test Time:    22:56:38

Pat Name:     MAREN CORTES           Department:   ED

Patient ID:   [id number]           Room:          

Gender:       M                        Technician:   ED

:          2003               Requested By: AMALIA VALERIO

Order Number: [id number]        Reading MD:   Alex Jacobson

                                 Measurements

Intervals                              Axis          

Rate:         112                      P:            139

KS:           139                      QRS:          122

QRSD:         92                       T:            -28

QT:           337                                    

QTc:          460                                    

                           Interpretive Statements

Sinus or ectopic atrial tachycardia

Probable left atrial enlargement

Left posterior fascicular block

Inferior infarct, age indeterminate

Lateral infarct, acute (LAD)

Baseline wander in lead(s) V1

Electronically Signed On 3- 14:06:19 PDT by Alex Jacobson



Please click the below link to view image of tracing.

## 2025-03-27 NOTE — DVH
EXAM:  XR Chest, 1 View



CLINICAL INDICATION:   SOB



TECHNIQUE:  Frontal view of the chest.



COMPARISON:   XY CHEST PORTABLE on DOS: 3/25/25, XY CHEST PORTABLE on DOS: 3/24/25



FINDINGS:



 LUNGS AND PLEURAL SPACES:  Left basilar atelectasis or pneumonia.  No pneumothorax.



 HEART:  Unremarkable.  No cardiomegaly.



 MEDIASTINUM:  Unremarkable.  Normal mediastinal contour.



 BONES/JOINTS:  Unremarkable.  No acute fracture.



 OTHER FINDINGS:  .



IMPRESSION:     



 Left basilar atelectasis or pneumonia.



Electronically Signed by: Mike Lafleur at 03/27/2025 07:15:28 AM

## 2025-03-27 NOTE — DVHDS2
Discharge Summary


Date of Admission


Mar 26, 2025 at 09:14





Date of Discharge:


Mar 27, 2025





Labs/Diagnostic Data:





                               Laboratory Results








Test


 3/27/25


06:16 3/26/25


09:00 3/26/25


06:07 3/26/25


05:11


 


White Blood Count


 10.1 10^3/uL


(4.4-10.8) 


 


 





 


Red Blood Count


 4.64 10^6/uL


(4.5-5.90) 


 


 





 


Hemoglobin


 14.0 g/dL


(13.5-17.5) 


 


 





 


Hematocrit


 39.8 %


(41.0-53.0) 


 


 





 


Mean Corpuscular Volume


 85.7 fL


(80.0-100.0) 


 


 





 


Mean Corpuscular Hemoglobin


 30.2 pg


(28.0-32.0) 


 


 





 


Mean Corpuscular Hemoglobin


Concent 35.2 g/dL


(32.0-36.0) 


 


 





 


Red Cell Distribution Width


 12.9 %


(11.8-14.3) 


 


 





 


Platelet Count


 269 10^3/uL


(140-450) 


 


 





 


Mean Platelet Volume


 7.5 fL


(6.9-10.8) 


 


 





 


Neutrophils (%) (Auto)


 68.8 %


(37.0-80.0) 


 


 





 


Lymphocytes (%) (Auto)


 20.7 %


(10.0-50.0) 


 


 





 


Monocytes (%) (Auto)


 8.8 %


(0.0-12.0) 


 


 





 


Eosinophils (%) (Auto)


 1.2 %


(0.0-7.0) 


 


 





 


Basophils (%) (Auto)


 0.5 %


(0.0-2.0) 


 


 





 


Neutrophils # (Auto)


 7.0 10 ^3/uL


(1.6-8.6) 


 


 





 


Lymphocytes # (Auto)


 2.1 10 ^3/uL


(0.4-5.4) 


 


 





 


Monocytes # (Auto)


 0.9 10 ^3/uL


(0-1.3) 


 


 





 


Eosinophils # (Auto)


 0.1 10 ^3/uL


(0-0.8) 


 


 





 


Basophils # (Auto)


 0 10 ^3/uL


(0-0.2) 


 


 





 


Nucleated Red Blood Cells 0.0 %    


 


Sodium Level


 139 mmol/L


(136-145) 


 


 





 


Potassium Level


 4.1 mmol/L


(3.5-5.1) 


 


 





 


Chloride Level


 104 mmol/L


() 


 


 





 


Carbon Dioxide Level


 29 mmol/L


(20-31) 


 


 





 


Anion Gap 6 (5-15)    


 


Blood Urea Nitrogen


 12 mg/dL


(9-23) 


 


 





 


Creatinine


 0.82 mg/dL


(0.700-1.30) 


 


 





 


Glomerular Filtration Rate


Calc 128 mL/min


(>90) 


 


 





 


BUN/Creatinine Ratio


 14.6


(10.0-20.0) 


 


 





 


Serum Glucose


 129 mg/dL


() 


 


 





 


Calcium Level


 9.6 mg/dL


(8.7-10.4) 


 


 





 


Total Bilirubin


 0.8 mg/dL


(0.2-1.0) 


 


 





 


Aspartate Amino Transferase


(AST) 26 U/L (13-40) 


 


 


 





 


Alanine Aminotransferase (ALT) 61 U/L (7-40)    


 


Alkaline Phosphatase


 56 U/L


() 


 


 





 


Total Protein


 7.0 g/dL


(5.7-8.2) 


 


 





 


Albumin


 4.2 g/dL


(3.2-4.8) 


 


 





 


Urine Color


 


 Light-yellow


(Yellow) 


 





 


Urine Clarity  Clear (Clear)   


 


Urine pH  7.0 (5.0-9.0)   


 


Urine Specific Gravity


 


 1.018


(1.001-1.035) 


 





 


Urine Protein


 


 Negative


(Negative) 


 





 


Urine Ketones


 


 Negative


(Negative) 


 





 


Urine Blood


 


 Negative /uL


(Negative) 


 





 


Urine Nitrite


 


 Negative


(Negative) 


 





 


Urine Bilirubin


 


 Negative


(Negative) 


 





 


Urine Urobilinogen


 


 Normal mg/dL


(Negative) 


 





 


Urine Leukocyte Esterase


 


 Negative /uL


(Negative) 


 





 


Urine RBC


 


 <1 /hpf (0 -


3) 


 





 


Urine Microscopic WBC  < 1 /HPF (0-3)   


 


Urine Squamous Epithelial


Cells 


 None seen /hpf


(<5) 


 





 


Urine Bacteria


 


 None seen /hpf


(None Seen) 


 





 


Urine Glucose


 


 3+ mg/dL


(Normal) 


 





 


Urine Opiates Screen  Neg (NEGATIVE)   


 


Urine Fentanyl Screen  Pos (NEGATIVE)   


 


Urine Barbiturates Screen  Neg (NEGATIVE)   


 


Urine Phencyclidine Screen  Neg (NEGATIVE)   


 


Urine Amphetamines Screen  Neg (NEGATIVE)   


 


Urine Benzodiazepines Screen  Neg (NEGATIVE)   


 


Urine Cocaine Screen  Neg (NEGATIVE)   


 


Urine Cannabinoids Screen  Pos (NEGATIVE)   


 


POC Glucose


 


 


 171 mg/dl


() 





 


Lactic Acid Level


 


 


 


 1.4 mmol/L


(0.4-2.0)





                             Other Laboratory Tests


3/27/25 06:16











Brief Hx & Hospital Course:


HPI: Jenna Brady is a 21-year-old male with past medical history of anxiety 

and drug induced psychosis, who came in for a fentanyl overdose. Family states 

they found him unconscious in his room with agonal respirations. They initiated 

CPR and called EMS. When EMS arrived his oxygenation was in the 70sm  Narcan was

given, and patient began to respond. He was placed on 15L nonrebreather and 

brought to ER. In ER patient received another dose of Narcan. ER attempted 

multiple times to wean patient off supplemental oxygen without success. On 

assessment patient is now A&O x 4, able to tell me that he remembers snorting 

fentanyl, taking a shower, then blacking out in bed. He states he has just 

started trying fentanyl this week. He last used marijuana last week, and 

methamphetamines about 2 months ago. Also states he is a current cigarette 

smoker and uses nicotine vape daily. I spoke with the patient's Mother. She 

states that he use to methamphetamines, and was sober for about 1 year. He now 

lives with his Grandmother, so she is not around him all the time. Patient has 

been seen here twice in the last 2 weeks for fentanyl overdose.





summary:  Patient history of drug abuse and recently tried fentanyl and had 

overdosed.  EMS was called and given Narcan, given another Narcan in ED. patient

was requiring oxygen to maintain saturations up to 15 L with non-rebreather.  

Patient admitted for opioid overdose and acute hypoxic respiratory failure due 

to former.  After Narcan patient improves and becomes A&O x4 but still lethargic

and sleepy in requiring oxygen.  On initial lab work patient also has 

leukocytosis, neutrophilia, hyperkalemia but no lactic acidosis.  On urine 

toxicology screen patient was positive for fentanyl and cannabinoids.  UA is 

concerning for +3 glucose otherwise normal.  On chest history patient has low 

lung volumes and bibasilar atelectasis and/or pneumonia. Patient is observed 

overnight allowing for detox and washout.  On a.m. eval on 03/27 patient is A&O 

x4, tolerating p.o., no nausea and vomiting, bowel sounds present and passing 

gas, ambulating without falls.  Patient denies any SI or HI.  Patient advised to

seek rehab and/or addiction groups/addiction Psychiatry.  Patient is stable for 

discharge as per plan below.





Diagnosis:


Acute hypoxic respiratory failure due to Opiate overdose, resolved


 aspiration pneumonia, Possible


suicide ideations ruled out, 


Polysubstance abuse, 


Leucocytosis, 


Anxiety, 


Drug induced psychosis, 





Discharge plan:


Take Augmentin 875 mg twice daily for 5 days 


Hold all drug abuse, counseled thoroughly.  If need patient to seek out rehab 

versus addiction Psychiatry through PCP 


Follow up with PCP .  PCP to make referral to addiction psychiatry.


Continue other home medications





Condition at Discharge:


Fair





Final Diagnosis/Problems List





Acute hypoxic respiratory failure due to Opiate overdose, resolved


 aspiration pneumonia, Possible


suicide ideations ruled out, 


Polysubstance abuse, 


Leucocytosis, 


Anxiety, 


Drug induced psychosis,





Discharge Disposition:


Home





Discharge Instruct/Medications


Diet:  Regular


Activity:  No Restrictions, As Tolerated


Follow Up/Referral:  


pcp


Medications:  


below


Discharge Statement:


"Patient was advised to return to the ER or call 911 if any headaches, 

dizziness, shortness of breath, chest pain, abdominal pain, bleeding, fevers, or

worsening of medical condition.





Patient was counseled about treatment plan, medications, possible side effects, 

patientverbalized understanding. All questions were answered to the best of my 

ability.





This discharge took greater then 30 minutes in planning, reviewing 

documentation, counseling the patient, and discussing with other team members."





Date of Service:  Mar 27, 2025


Billing Provider:  MICHAEL VALDEZ MD


Common Visit Codes:  76133-RLC/OBS DISCH DAY >30min











MICHAEL VALDEZ MD           Mar 27, 2025 17:10